# Patient Record
Sex: FEMALE | Race: ASIAN | URBAN - METROPOLITAN AREA
[De-identification: names, ages, dates, MRNs, and addresses within clinical notes are randomized per-mention and may not be internally consistent; named-entity substitution may affect disease eponyms.]

---

## 2020-09-28 NOTE — PROGRESS NOTES
SUBJECTIVE   Jeni Valles is a 30 year old G0 who presents for consultation for painful intercourse.     She has several concerns today. She recently moved from [place] with her , they got  3 months ago. Currently not working.     Has pain with intercourse. Has pins/needles type pain with penetration, and also a 'tightening' or muscular pain. She was initially seen for this in [place], and was told to use more lubrication. She has tried using Replens which relieved the pins/needles pain, but still has the muscular pain with penetration. She is able to insert a finger or lubricant applicator without significant pain. She is frustrated with this and hoping to enjoy intercourse with her new .     She has had recent weight gain 6-7kg over the last 6 months despite feeling that she is eating the same amount. Has had previous thyroid and glucose testing in [place] which was normal. Has a previous diagnosis of PCOS, wondering if this is contributing.       Gynecologic History  Patient's last menstrual period was 09/04/2020.   Has cycles lasting 32-35 days, periods 3-4 days. Has intense cramping with periods, denies spotting in between cycles.   Current contraception: None  Has never had a pap smear     Obstetric History  OB History   No obstetric history on file.        Past Medical History  No past medical history on file.   PCOS diagnosed 2015 by ultrasound - briefly on Metformin and birth control pills but then stopped. Birth control pills she tried 6-7 days and had breast pain with them    Past Surgical History  No past surgical history on file.   Hx R ovarian torsion with laparoscopic R cystectomy in 2016, denies removal of ovary or tube    Medications  No current outpatient medications on file.     No current facility-administered medications for this visit.    Multivitamin    Allergies   No Known Allergies    Social History , living with , currently not working  Social History     Tobacco  "Use     Smoking status: Never Smoker     Smokeless tobacco: Never Used   Substance Use Topics     Alcohol use: Never     Frequency: Never     Drug use: Never       Family History  No family history on file.   Denies fam hx ovarian, uterine, breast, cervical cancer  Dad with HTN    Review of Systems  Negative unless specified above    OBJECTIVE   /83   Pulse 84   Ht 1.6 m (5' 3\")   Wt 72.6 kg (160 lb)   LMP 09/04/2020   Breastfeeding No   BMI 28.34 kg/m    BMI: Body mass index is 28.34 kg/m .  General:  Alert, no distress       Lungs:  Clear to auscultation bilaterally   Heart:  Regular rate and rhythm, no murmur   Abdomen:  Soft, non-tender, non-distended   Pelvic: Normal external genitalia without skin lesions. Pain with palpation of posterior introitus and initial insertion of single finger. Pain with palpation of posterior perineum with tightened musculature, and with insertion of 2 fingers. No significant pain with palpation of posterior fornix, cervix, fundus, or adnexa bilaterally. No significant masses appreciated. Blind  Pap smear obtained.    Extremities:  normal       Imaging:   Pelvic ultrasound ordered    ASSESSMENT   Jeni Valles is a 30 year old G0 here for pain with intercourse. Exam consistent with musculoskeletal tightening with sensitivity of pelvic floor. Plan referral to pelvic floor PT - discussed findings and plan with patient. She desires follow up ultrasound to evaluate ovaries given history of cyst, ordered today despite otherwise normal exam.     Concerned about recent weight gain, discussed that she is currently considered 'overweight.' Has had thyroid testing in [place], discussed this could be checked again. Discussed establishing care with PCP for further follow up.    Patient also desires discussion of contraceptive options once sexually active and preconception counselling - ideally would like children in 2 years. Discussed that she return for ultrasound results and discussion " of these two items. Interested in updating vaccinations - does not have immunization records from [place]. Consider Hep B, Varicella, MMR testing.     PLAN     #Pain with intercourse  - Continue use of lubrication  - Referral to pelvic floor physical therapy for assistance tightened pelvic floor muscles  - Pelvic ultrasound given history of ovarian cyst    #Weight gain  - Follow up with PCP    RTC 4-6 weeks  Discussed with Dr. Santos Branch MD PGY2  Obstetrics & Gynecology  09/28/20       The Patient was seen in [facility] by FAIZA BRANCH.  I reviewed the history & exam. Assessment and plan were jointly made.    Kat Graham MD

## 2020-09-29 ENCOUNTER — OFFICE VISIT (OUTPATIENT)
Dept: OBGYN | Facility: CLINIC | Age: 30
End: 2020-09-29
Attending: STUDENT IN AN ORGANIZED HEALTH CARE EDUCATION/TRAINING PROGRAM
Payer: COMMERCIAL

## 2020-09-29 VITALS
SYSTOLIC BLOOD PRESSURE: 119 MMHG | HEART RATE: 84 BPM | WEIGHT: 160 LBS | DIASTOLIC BLOOD PRESSURE: 83 MMHG | BODY MASS INDEX: 28.35 KG/M2 | HEIGHT: 63 IN

## 2020-09-29 DIAGNOSIS — Z12.4 SCREENING FOR MALIGNANT NEOPLASM OF CERVIX: ICD-10-CM

## 2020-09-29 DIAGNOSIS — Z01.419 ENCOUNTER FOR GYNECOLOGICAL EXAMINATION WITHOUT ABNORMAL FINDING: ICD-10-CM

## 2020-09-29 DIAGNOSIS — R10.2 PELVIC PAIN IN FEMALE: Primary | ICD-10-CM

## 2020-09-29 PROCEDURE — G0463 HOSPITAL OUTPT CLINIC VISIT: HCPCS | Mod: ZF

## 2020-09-29 PROCEDURE — G0145 SCR C/V CYTO,THINLAYER,RESCR: HCPCS | Performed by: STUDENT IN AN ORGANIZED HEALTH CARE EDUCATION/TRAINING PROGRAM

## 2020-09-29 PROCEDURE — G0123 SCREEN CERV/VAG THIN LAYER: HCPCS | Performed by: STUDENT IN AN ORGANIZED HEALTH CARE EDUCATION/TRAINING PROGRAM

## 2020-09-29 PROCEDURE — 87624 HPV HI-RISK TYP POOLED RSLT: CPT | Performed by: STUDENT IN AN ORGANIZED HEALTH CARE EDUCATION/TRAINING PROGRAM

## 2020-09-29 SDOH — HEALTH STABILITY: MENTAL HEALTH: HOW OFTEN DO YOU HAVE A DRINK CONTAINING ALCOHOL?: NEVER

## 2020-09-29 ASSESSMENT — MIFFLIN-ST. JEOR: SCORE: 1414.89

## 2020-09-29 ASSESSMENT — PAIN SCALES - GENERAL: PAINLEVEL: NO PAIN (0)

## 2020-09-29 NOTE — NURSING NOTE
Chief Complaint   Patient presents with     Establish Care     C/O pain with intercourse   Dionna Regan LPN

## 2020-09-29 NOTE — LETTER
9/29/2020       RE: Jeni Valles  [address]  [place] [address]     Dear Colleague,    Thank you for referring your patient, Jeni Valles, to the [facility] at [facility]. Please see a copy of my visit note below.    SUBJECTIVE   Jeni Valles is a 30 year old G0 who presents for consultation for painful intercourse.     She has several concerns today. She recently moved from [place] with her , they got  3 months ago. Currently not working.     Has pain with intercourse. Has pins/needles type pain with penetration, and also a 'tightening' or muscular pain. She was initially seen for this in [facility], and was told to use more lubrication. She has tried using Replens which relieved the pins/needles pain, but still has the muscular pain with penetration. She is able to insert a finger or lubricant applicator without significant pain. She is frustrated with this and hoping to enjoy intercourse with her new .     She has had recent weight gain 6-7kg over the last 6 months despite feeling that she is eating the same amount. Has had previous thyroid and glucose testing in [facility] which was normal. Has a previous diagnosis of PCOS, wondering if this is contributing.       Gynecologic History  Patient's last menstrual period was 09/04/2020.   Has cycles lasting 32-35 days, periods 3-4 days. Has intense cramping with periods, denies spotting in between cycles.   Current contraception: None  Has never had a pap smear     Obstetric History  OB History   No obstetric history on file.        Past Medical History  No past medical history on file.   PCOS diagnosed 2015 by ultrasound - briefly on Metformin and birth control pills but then stopped. Birth control pills she tried 6-7 days and had breast pain with them    Past Surgical History  No past surgical history on file.   Hx R ovarian torsion with laparoscopic R cystectomy in 2016, denies removal of  "ovary or tube    Medications  No current outpatient medications on file.     No current facility-administered medications for this visit.    Multivitamin    Allergies   No Known Allergies    Social History , living with , currently not working  Social History     Tobacco Use     Smoking status: Never Smoker     Smokeless tobacco: Never Used   Substance Use Topics     Alcohol use: Never     Frequency: Never     Drug use: Never       Family History  No family history on file.   Denies fam hx ovarian, uterine, breast, cervical cancer  Dad with HTN    Review of Systems  Negative unless specified above    OBJECTIVE   /83   Pulse 84   Ht 1.6 m (5' 3\")   Wt 72.6 kg (160 lb)   LMP 09/04/2020   Breastfeeding No   BMI 28.34 kg/m    BMI: Body mass index is 28.34 kg/m .  General:  Alert, no distress       Lungs:  Clear to auscultation bilaterally   Heart:  Regular rate and rhythm, no murmur   Abdomen:  Soft, non-tender, non-distended   Pelvic: Normal external genitalia without skin lesions. Pain with palpation of posterior introitus and initial insertion of single finger. Pain with palpation of posterior perineum with tightened musculature, and with insertion of 2 fingers. No significant pain with palpation of posterior fornix, cervix, fundus, or adnexa bilaterally. No significant masses appreciated. Blind  Pap smear obtained.    Extremities:  normal       Imaging:   Pelvic ultrasound ordered    ASSESSMENT   Jeni Valles is a 30 year old G0 here for pain with intercourse. Exam consistent with musculoskeletal tightening with sensitivity of pelvic floor. Plan referral to pelvic floor PT - discussed findings and plan with patient. She desires follow up ultrasound to evaluate ovaries given history of cyst, ordered today despite otherwise normal exam.     Concerned about recent weight gain, discussed that she is currently considered 'overweight.' Has had thyroid testing in [place], discussed this could be " checked again. Discussed establishing care with PCP for further follow up.    Patient also desires discussion of contraceptive options once sexually active and preconception counselling - ideally would like children in 2 years. Discussed that she return for ultrasound results and discussion of these two items. Interested in updating vaccinations - does not have immunization records from [place]. Consider Hep B, Varicella, MMR testing.     PLAN     #Pain with intercourse  - Continue use of lubrication  - Referral to pelvic floor physical therapy for assistance tightened pelvic floor muscles  - Pelvic ultrasound given history of ovarian cyst    #Weight gain  - Follow up with PCP    RTC 4-6 weeks  Discussed with Dr. Santos Branch MD PGY2  Obstetrics & Gynecology  09/28/20       The Patient was seen in [facility] by FAIZA BRANCH.  I reviewed the history & exam. Assessment and plan were jointly made.    Kat rGaham MD

## 2020-10-02 LAB
COPATH REPORT: NORMAL
PAP: NORMAL

## 2020-10-05 LAB
FINAL DIAGNOSIS: NORMAL
HPV HR 12 DNA CVX QL NAA+PROBE: NEGATIVE
HPV16 DNA SPEC QL NAA+PROBE: NEGATIVE
HPV18 DNA SPEC QL NAA+PROBE: NEGATIVE
SPECIMEN DESCRIPTION: NORMAL
SPECIMEN SOURCE CVX/VAG CYTO: NORMAL

## 2020-10-28 NOTE — PROGRESS NOTES
thSUBJECTIVE   Jeni Valles is a 30 year old P0 who presents for follow up after consultation for painful intercourse, concerning for vaginismus.     She was referred to pelvic floor physical therapy and has seen Margo Lundberg a few times so far. She was recommended to continue a course for 8-10 weeks. They have mostly worked on pelvic floor relaxation so far, but are planning to discuss use of dilators. So far, it has helped slightly but not much. She has attempted intercourse a few times so far and is still experiencing a tightness type of pain with penetration. Continues to use lubricant for intercourse.     Her last cycle was longer than usual, lasting 40 days instead of 32-33 days. Has not yet followed up with PCP about weight gain. Had abdominal pelvic ultrasound today, transvaginal ultrasound would have been too painful for her.    She has had low mood and low libido lately also. She has noticed difficulty waking up some mornings, low mood sometimes, increase in weight. Also endorses that she carries a lot of stress with her, that many people in her life have told her she can be too stressed at times and should try to relax more. Sometimes she has difficulty completing her day due to anxieties, and preoccupation with thoughts. She notes that she does have some fear and anxiety about intercourse. She tried to see a therapist in [place] a few years ago and had a few sessions, but they were intolerable for her. It was very difficult for her to talk about her thoughts and that she would spend a whole day in anticipation of an appointment being worried about it. She does not necessarily feel like these symptoms are cyclical in relation to her menstrual cycle but is unsure.    She does exercise sometimes, but not regularly. She does feel supported by . She has less responsibilities lately so has been trying to de-stress. She used to do yoga but hasn't recently.      Gynecologic History  Patient's last menstrual  "period was 10/14/2020.   Has cycles lasting 32-35 days, periods 3-4 days. Has intense cramping with periods, denies spotting in between cycles.   Current contraception: None  Has never had a pap smear     Obstetric History  OB History   No obstetric history on file.        Past Medical History  PCOS diagnosed 2015 by ultrasound - briefly on Metformin and birth control pills but then stopped. Birth control pills she tried 6-7 days and had breast pain with them    Past Surgical History  Hx R ovarian torsion with laparoscopic R cystectomy in 2016, denies removal of ovary or tube    Medications  Multivitamin    Social History , living with , currently not working    Family History  No family history on file.   Denies fam hx ovarian, uterine, breast, cervical cancer  Dad with HTN    Review of Systems  Negative unless specified above    OBJECTIVE   /80   Pulse 79   Ht 1.6 m (5' 3\")   Wt 71.2 kg (157 lb)   LMP 10/14/2020   Breastfeeding No   BMI 27.81 kg/m    BMI: Body mass index is 27.81 kg/m .  General:  Alert, no distress       Lungs:  Regular work of breathing   Heart:  Well perfused           Extremities:  normal       Labs:   8/31/20  A1c 5.3    9/29/20  HPV neg, NILM    Imaging:   Pelvic ultrasound 10/29/20  Uterine findings:              Presence: Visible Size: Normal 6.9x 5.3 x 3.2 cm.  Endometrium = 2.5 mm.              Cx length = 25.7 mm.                            Flexion:  Anteverted    Position: Midline          Margins: Smooth         Shape: Normal              Contour: Regular        Texture: Homogeneous          Cavity: Normal            Masses: Normal     Pelvic findings:               Right Adnexa: Normal              Left Adnexa: Normal              Bladder:  Normal                                                           Cul - de - sac fluid: None     Ovarian follicles:              Right ovary:  4.6 x 3.5x 2.1cm.                 1 follicle                 Size(s):  2.6 x " 2.4 x 1.8cm                 Left ovary:  Not visualized     Comments: Simple appearing right ovarian cyst. Otherwise normal pelvic ultrasound.     ASSESSMENT   Jeni Valles is a 30 year old P0 here for follow up after initial consult for pain with intercourse concerning for vaginismus. Referred to pelvic floor PT which hasn't helped much so far but she has 8-10 sessions left. Pelvic US today revealed no significant abnormalities but was somewhat limited by only using abdominal window.     For dyspareunia, we discussed continuing pelvic floor PT, and looking into purchasing vaginal dilators. Referred to [email or web address]. Continue to use lubrication.     She has symptoms of both generalized anxiety and major depression. We discussed tracking mood in relation to menstrual cycle to see if hormonal medication would be beneficial. Will also order TSH to rule out thyroid disorder. She has previously had Hgb A1c which was normal. At this time, she would like to avoid medicaitons if possible. Discussed referral to therapy at length including description of CBT and starting with pure relaxation strategies such as breathing exercises or muscle relaxation exercises if she is first uncomfortable expressing her thoughts. Discussed the importance of a therapeutic relationship and finding a good fit for her. At end of discussion she was open to seeing a [facility] provider.     We discussed other strategies for wellbeing including returning to regular exercise and healthy habits.     PLAN     #Dyspareunia  - Pelvic ultrasound normal but limited  - Continue use of lubrication  - Continue pelvic floor physical therapy  - Referred to [email or web address]   - Consider buying vaginal dilators  - Consider referral to center for sexual health at next visit    #Mood disorder  - TSH to rule out thyroid dysfunction  - Referral to [facility] psychologist Chikis Hernandez  - Continue regular exercise    RTC in 2-3 months    Discussed with Dr. Winston Hernandez MD  PGY2  Obstetrics & Gynecology  10/28/20 '    The patient was seen in resident continuity clinic by Dr. Hernandez.  I have reviewed the history and exam, the assessment and plan were jointly made.     Gabriela Montero MD, FACOG

## 2020-10-29 ENCOUNTER — OFFICE VISIT (OUTPATIENT)
Dept: OBGYN | Facility: CLINIC | Age: 30
End: 2020-10-29
Attending: OBSTETRICS & GYNECOLOGY
Payer: COMMERCIAL

## 2020-10-29 ENCOUNTER — ANCILLARY PROCEDURE (OUTPATIENT)
Dept: ULTRASOUND IMAGING | Facility: CLINIC | Age: 30
End: 2020-10-29
Attending: STUDENT IN AN ORGANIZED HEALTH CARE EDUCATION/TRAINING PROGRAM
Payer: COMMERCIAL

## 2020-10-29 VITALS
HEIGHT: 63 IN | BODY MASS INDEX: 27.82 KG/M2 | SYSTOLIC BLOOD PRESSURE: 119 MMHG | WEIGHT: 157 LBS | DIASTOLIC BLOOD PRESSURE: 80 MMHG | HEART RATE: 79 BPM

## 2020-10-29 DIAGNOSIS — R10.2 PELVIC PAIN IN FEMALE: ICD-10-CM

## 2020-10-29 DIAGNOSIS — R68.82 LOW LIBIDO: ICD-10-CM

## 2020-10-29 DIAGNOSIS — N94.2 VAGINISMUS: Primary | ICD-10-CM

## 2020-10-29 LAB — TSH SERPL DL<=0.005 MIU/L-ACNC: 2.06 MU/L (ref 0.4–4)

## 2020-10-29 PROCEDURE — 84443 ASSAY THYROID STIM HORMONE: CPT | Performed by: STUDENT IN AN ORGANIZED HEALTH CARE EDUCATION/TRAINING PROGRAM

## 2020-10-29 PROCEDURE — 36415 COLL VENOUS BLD VENIPUNCTURE: CPT | Performed by: STUDENT IN AN ORGANIZED HEALTH CARE EDUCATION/TRAINING PROGRAM

## 2020-10-29 PROCEDURE — 76856 US EXAM PELVIC COMPLETE: CPT

## 2020-10-29 PROCEDURE — G0463 HOSPITAL OUTPT CLINIC VISIT: HCPCS | Mod: 25

## 2020-10-29 PROCEDURE — 99213 OFFICE O/P EST LOW 20 MIN: CPT | Mod: GE | Performed by: OBSTETRICS & GYNECOLOGY

## 2020-10-29 PROCEDURE — 76856 US EXAM PELVIC COMPLETE: CPT | Mod: 26 | Performed by: OBSTETRICS & GYNECOLOGY

## 2020-10-29 ASSESSMENT — MIFFLIN-ST. JEOR: SCORE: 1401.28

## 2020-10-29 NOTE — LETTER
10/29/2020       RE: Jeni Valles  [address]  [place] [address]     Dear Colleague,    Thank you for referring your patient, Jeni Valles, to the [facility] [place] at [facility]. Please see a copy of my visit note below.    thSUBJECTIVE   Jeni Valles is a 30 year old P0 who presents for follow up after consultation for painful intercourse, concerning for vaginismus.     She was referred to pelvic floor physical therapy and has seen Margo Lundberg a few times so far. She was recommended to continue a course for 8-10 weeks. They have mostly worked on pelvic floor relaxation so far, but are planning to discuss use of dilators. So far, it has helped slightly but not much. She has attempted intercourse a few times so far and is still experiencing a tightness type of pain with penetration. Continues to use lubricant for intercourse.     Her last cycle was longer than usual, lasting 40 days instead of 32-33 days. Has not yet followed up with PCP about weight gain. Had abdominal pelvic ultrasound today, transvaginal ultrasound would have been too painful for her.    She has had low mood and low libido lately also. She has noticed difficulty waking up some mornings, low mood sometimes, increase in weight. Also endorses that she carries a lot of stress with her, that many people in her life have told her she can be too stressed at times and should try to relax more. Sometimes she has difficulty completing her day due to anxieties, and preoccupation with thoughts. She notes that she does have some fear and anxiety about intercourse. She tried to see a therapist in [place] a few years ago and had a few sessions, but they were intolerable for her. It was very difficult for her to talk about her thoughts and that she would spend a whole day in anticipation of an appointment being worried about it. She does not necessarily feel like these symptoms are cyclical  "in relation to her menstrual cycle but is unsure.    She does exercise sometimes, but not regularly. She does feel supported by . She has less responsibilities lately so has been trying to de-stress. She used to do yoga but hasn't recently.      Gynecologic History  Patient's last menstrual period was 10/14/2020.   Has cycles lasting 32-35 days, periods 3-4 days. Has intense cramping with periods, denies spotting in between cycles.   Current contraception: None  Has never had a pap smear     Obstetric History  OB History   No obstetric history on file.        Past Medical History  PCOS diagnosed 2015 by ultrasound - briefly on Metformin and birth control pills but then stopped. Birth control pills she tried 6-7 days and had breast pain with them    Past Surgical History  Hx R ovarian torsion with laparoscopic R cystectomy in 2016, denies removal of ovary or tube    Medications  Multivitamin    Social History , living with , currently not working    Family History  No family history on file.   Denies fam hx ovarian, uterine, breast, cervical cancer  Dad with HTN    Review of Systems  Negative unless specified above    OBJECTIVE   /80   Pulse 79   Ht 1.6 m (5' 3\")   Wt 71.2 kg (157 lb)   LMP 10/14/2020   Breastfeeding No   BMI 27.81 kg/m    BMI: Body mass index is 27.81 kg/m .  General:  Alert, no distress       Lungs:  Regular work of breathing   Heart:  Well perfused           Extremities:  normal       Labs:   8/31/20  A1c 5.3    9/29/20  HPV neg, NILM    Imaging:   Pelvic ultrasound 10/29/20  Uterine findings:              Presence: Visible Size: Normal 6.9x 5.3 x 3.2 cm.  Endometrium = 2.5 mm.              Cx length = 25.7 mm.                            Flexion:  Anteverted    Position: Midline          Margins: Smooth         Shape: Normal              Contour: Regular        Texture: Homogeneous          Cavity: Normal            Masses: Normal     Pelvic findings: "               Right Adnexa: Normal              Left Adnexa: Normal              Bladder:  Normal                                                           Cul - de - sac fluid: None     Ovarian follicles:              Right ovary:  4.6 x 3.5x 2.1cm.                 1 follicle                 Size(s):  2.6 x 2.4 x 1.8cm                 Left ovary:  Not visualized     Comments: Simple appearing right ovarian cyst. Otherwise normal pelvic ultrasound.     ASSESSMENT   Jeni Vlales is a 30 year old P0 here for follow up after initial consult for pain with intercourse concerning for vaginismus. Referred to pelvic floor PT which hasn't helped much so far but she has 8-10 sessions left. Pelvic US today revealed no significant abnormalities but was somewhat limited by only using abdominal window.     For dyspareunia, we discussed continuing pelvic floor PT, and looking into purchasing vaginal dilators. Referred to [email or web address]. Continue to use lubrication.     She has symptoms of both generalized anxiety and major depression. We discussed tracking mood in relation to menstrual cycle to see if hormonal medication would be beneficial. Will also order TSH to rule out thyroid disorder. She has previously had Hgb A1c which was normal. At this time, she would like to avoid medicaitons if possible. Discussed referral to therapy at length including description of CBT and starting with pure relaxation strategies such as breathing exercises or muscle relaxation exercises if she is first uncomfortable expressing her thoughts. Discussed the importance of a therapeutic relationship and finding a good fit for her. At end of discussion she was open to seeing a [facility] provider.     We discussed other strategies for wellbeing including returning to regular exercise and healthy habits.     PLAN     #Dyspareunia  - Pelvic ultrasound normal but limited  - Continue use of lubrication  - Continue pelvic floor physical therapy  - Referred to  [email or web address]   - Consider buying vaginal dilators  - Consider referral to center for sexual health at next visit    #Mood disorder  - TSH to rule out thyroid dysfunction  - Referral to [facility] psychologist Chikis Hernandez  - Continue regular exercise    RTC in 2-3 months    Discussed with Dr. Winston Hernandez MD PGY2  Obstetrics & Gynecology  10/28/20 '    The patient was seen in resident continuity clinic by Dr. Hernandez.  I have reviewed the history and exam, the assessment and plan were jointly made.     Gabriela Montero MD, FACOG

## 2021-01-04 ENCOUNTER — HEALTH MAINTENANCE LETTER (OUTPATIENT)
Age: 31
End: 2021-01-04

## 2021-10-11 ENCOUNTER — HEALTH MAINTENANCE LETTER (OUTPATIENT)
Age: 31
End: 2021-10-11

## 2022-01-30 ENCOUNTER — HEALTH MAINTENANCE LETTER (OUTPATIENT)
Age: 32
End: 2022-01-30

## 2022-09-24 ENCOUNTER — HEALTH MAINTENANCE LETTER (OUTPATIENT)
Age: 32
End: 2022-09-24

## 2023-05-08 ENCOUNTER — HEALTH MAINTENANCE LETTER (OUTPATIENT)
Age: 33
End: 2023-05-08